# Patient Record
(demographics unavailable — no encounter records)

---

## 2025-03-10 NOTE — PHYSICAL EXAM
[Well Developed] : well developed [NAD] : in no acute distress [PERRL] : pupils were equal, round, reactive to light  [icteric] : anicteric [Moist & Pink Mucous Membranes] : moist and pink mucous membranes [CTAB] : lungs clear to auscultation bilaterally [Respiratory Distress] : no respiratory distress  [Regular Rate and Rhythm] : regular rate and rhythm [Normal S1, S2] : normal S1 and S2 [Soft] : soft  [Distended] : non distended [Tender] : non tender [Normal Bowel Sounds] : normal bowel sounds [No HSM] : no hepatosplenomegaly appreciated [Fissure] : anal fissures [Hemorrhoids] : hemorrhoids [Normal Tone] : normal tone [Well-Perfused] : well-perfused [Edema] : no edema [Cyanosis] : no cyanosis [Rash] : no rash [Jaundice] : no jaundice [Interactive] : interactive [de-identified] : Anal fissure noted at 6:00.  Small hemorrhoid noted at 6:00

## 2025-03-10 NOTE — ASSESSMENT
[Educated Patient & Family about Diagnosis] : educated the patient and family about the diagnosis [FreeTextEntry1] : COLTON  is a 6 year old  here for consultation for rectal bleeding.  Exam noted an anal fissure and a small hemorrhoid at the 6 o'clock position.    Differential diagnosis  includes juvenile polyps, constipation, colitis, external or internal hemorrhoids, anal fissures, Meckel's diverticulum           Recommendations Recommend high-fiber diet  Dulcolax soft chews 1 to 2/day daily.  Preparation H up to 3-4 times a day  Anusol HC twice a day for 7 days  Follow-up in 4 weeks.  If bleeding persist consider doing colonoscopy to look for evidence of colitis or polyp   This note was done with a voice recognition transcription software and any typos are related to this rather than medical error.

## 2025-03-10 NOTE — CONSULT LETTER
[Dear  ___] : Dear  [unfilled], [Consult Letter:] : I had the pleasure of evaluating your patient, [unfilled]. [Please see my note below.] : Please see my note below. [Consult Closing:] : Thank you very much for allowing me to participate in the care of this patient.  If you have any questions, please do not hesitate to contact me. [Sincerely,] : Sincerely, [FreeTextEntry3] : Elenita Cordoba MD [facility] physician partners Pediatric gastroenterologist , [facility] at [facility] [phone] [email or web address]

## 2025-03-10 NOTE — HISTORY OF PRESENT ILLNESS
[de-identified] : referred by  ISA FULLER  COLTON ALDANA , is  a 6 year old male here for  initial   consultation for  blood in the stool for 2-3 months . The bleeding is noted with every bowel movement, both on wiping and visible on the stool. Initially occurring occasionally, it has become consistent over the past three months. The patient's regular doctor recommended an ultrasound, which came back normal. Prior to the onset of bleeding, Colton had regular daily bowel movements in the evening. Currently, he is having bowel movements every other day, likely due to anxiety about the bleeding. The stool consistency has varied, with the first part of the bowel movement often being harder, followed by softer stool.  Stools are usually described as Carter 3-2 Carter 4.  The patient reports having to push during defecation, especially when the stool is larger. Pain is associated with defecation. The bleeding occurs with every bowel movement but does not turn the toilet water red. The patient denies abdominal pain before bowel movements but reports discomfort when sitting in class.  Prior to 3 months  stools were daily and no straining was noted.  PCP noted 3 anal fissures.  Recommended Dulcolax soft chews.  Has been using Dulcolax soft chews every other day for about a month, as recommended by their doctor, along with applying Vaseline after bowel movements. Despite these interventions, the bleeding has persisted.   - Past Medical History: History of dairy and egg allergies in early childhood   - Past Surgical History: No significant surgical history reported   - Family History:     - No family history of Crohn's disease, celiac disease, ulcerative colitis, or chronic constipation reported   - Social History:     - Lives at home with parents and younger sister     - Two cats in the household Had abdominal [facility] done [facility] which was normalDone January 30, 2025.  used to have regular stools.  stools are type III or IV daily.   ? striaing  stooling.  using Dulcolax soft chews 1 every other day since seing PCP for the next month  uses valdoine.     diet- very carb heavy.  eats whole wheat bread.  eats white pasta.  eats fruit. eats chicken.         Denies abdominal pain, nausea, vomiting, diarrhea, easy bleeding or bruising, jaundice,  recurrent fevers or infection, mouth sores, joint swelling, vision changes, unintentional weight loss.    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